# Patient Record
Sex: FEMALE | Race: WHITE | NOT HISPANIC OR LATINO
[De-identification: names, ages, dates, MRNs, and addresses within clinical notes are randomized per-mention and may not be internally consistent; named-entity substitution may affect disease eponyms.]

---

## 2018-08-22 ENCOUNTER — RESULT REVIEW (OUTPATIENT)
Age: 23
End: 2018-08-22

## 2019-06-26 PROBLEM — Z00.00 ENCOUNTER FOR PREVENTIVE HEALTH EXAMINATION: Status: ACTIVE | Noted: 2019-06-26

## 2019-07-22 ENCOUNTER — RX RENEWAL (OUTPATIENT)
Age: 24
End: 2019-07-22

## 2019-08-21 ENCOUNTER — RECORD ABSTRACTING (OUTPATIENT)
Age: 24
End: 2019-08-21

## 2019-08-21 DIAGNOSIS — Z82.62 FAMILY HISTORY OF OSTEOPOROSIS: ICD-10-CM

## 2019-08-21 DIAGNOSIS — A63.0 ANOGENITAL (VENEREAL) WARTS: ICD-10-CM

## 2019-08-21 DIAGNOSIS — Z82.49 FAMILY HISTORY OF ISCHEMIC HEART DISEASE AND OTHER DISEASES OF THE CIRCULATORY SYSTEM: ICD-10-CM

## 2019-08-21 DIAGNOSIS — R03.0 ELEVATED BLOOD-PRESSURE READING, W/OUT DIAGNOSIS OF HYPERTENSION: ICD-10-CM

## 2019-08-21 DIAGNOSIS — Z80.3 FAMILY HISTORY OF MALIGNANT NEOPLASM OF BREAST: ICD-10-CM

## 2019-08-21 DIAGNOSIS — J45.909 UNSPECIFIED ASTHMA, UNCOMPLICATED: ICD-10-CM

## 2019-08-21 DIAGNOSIS — Z78.9 OTHER SPECIFIED HEALTH STATUS: ICD-10-CM

## 2019-08-21 DIAGNOSIS — G43.909 MIGRAINE, UNSPECIFIED, NOT INTRACTABLE, W/OUT STATUS MIGRAINOSUS: ICD-10-CM

## 2019-08-21 LAB — CYTOLOGY CVX/VAG DOC THIN PREP: NORMAL

## 2019-08-24 ENCOUNTER — LABORATORY RESULT (OUTPATIENT)
Age: 24
End: 2019-08-24

## 2019-08-24 ENCOUNTER — TRANSCRIPTION ENCOUNTER (OUTPATIENT)
Age: 24
End: 2019-08-24

## 2019-08-24 ENCOUNTER — APPOINTMENT (OUTPATIENT)
Dept: OBGYN | Facility: CLINIC | Age: 24
End: 2019-08-24
Payer: COMMERCIAL

## 2019-08-24 PROCEDURE — 99395 PREV VISIT EST AGE 18-39: CPT

## 2019-08-24 PROCEDURE — 81025 URINE PREGNANCY TEST: CPT

## 2020-11-28 ENCOUNTER — RX RENEWAL (OUTPATIENT)
Age: 25
End: 2020-11-28

## 2020-12-03 RX ORDER — ETONOGESTREL AND ETHINYL ESTRADIOL 11.7; 2.7 MG/1; MG/1
0.12-0.015 INSERT, EXTENDED RELEASE VAGINAL
Qty: 1 | Refills: 0 | Status: ACTIVE | COMMUNITY
Start: 2019-07-22

## 2021-01-02 ENCOUNTER — APPOINTMENT (OUTPATIENT)
Dept: OBGYN | Facility: CLINIC | Age: 26
End: 2021-01-02
Payer: COMMERCIAL

## 2021-01-02 VITALS
TEMPERATURE: 97.2 F | DIASTOLIC BLOOD PRESSURE: 70 MMHG | SYSTOLIC BLOOD PRESSURE: 126 MMHG | HEIGHT: 59 IN | WEIGHT: 132 LBS | BODY MASS INDEX: 26.61 KG/M2

## 2021-01-02 DIAGNOSIS — Z80.1 FAMILY HISTORY OF MALIGNANT NEOPLASM OF TRACHEA, BRONCHUS AND LUNG: ICD-10-CM

## 2021-01-02 DIAGNOSIS — Z83.3 FAMILY HISTORY OF DIABETES MELLITUS: ICD-10-CM

## 2021-01-02 DIAGNOSIS — Z78.9 OTHER SPECIFIED HEALTH STATUS: ICD-10-CM

## 2021-01-02 DIAGNOSIS — Z01.419 ENCOUNTER FOR GYNECOLOGICAL EXAMINATION (GENERAL) (ROUTINE) W/OUT ABNORMAL FINDINGS: ICD-10-CM

## 2021-01-02 PROCEDURE — 99072 ADDL SUPL MATRL&STAF TM PHE: CPT

## 2021-01-02 PROCEDURE — 99395 PREV VISIT EST AGE 18-39: CPT

## 2021-01-02 RX ORDER — FREMANEZUMAB-VFRM 225 MG/1.5ML
225 INJECTION SUBCUTANEOUS
Qty: 2 | Refills: 0 | Status: ACTIVE | COMMUNITY
Start: 2020-11-05

## 2021-01-02 NOTE — HISTORY OF PRESENT ILLNESS
[Intravaginal Ring] : uses the intravaginal ring [Y] : Patient is sexually active [N] : Patient denies prior pregnancies [Menarche Age: ____] : age at menarche was [unfilled] [Currently Active] : currently active [Men] : men [Vaginal] : vaginal [No] : No [Yes] : Yes [Condoms] : Condoms [TextBox_4] : 25-year-old G0 LMP 12/30/2020 presents for annual gynecological exam\par \par Patient is requesting to continue NuvaRing and tolerating it well\par  precautions reviewed\par Patient is getting  in July 2021 at the [facility]\par She is a teacher [Mammogramdate] : NA [BreastSonogramDate] : 10/9/2019 [PapSmeardate] : 8/24/2019 [GonorrheaDate] : 8/24/2019 [ChlamydiaDate] : 8/24/2019 [LMPDate] : 12/30/2020 [PGHxTotal] : 0 [FreeTextEntry1] : 12/30/2020

## 2021-01-04 LAB
C TRACH RRNA SPEC QL NAA+PROBE: NOT DETECTED
N GONORRHOEA RRNA SPEC QL NAA+PROBE: NOT DETECTED
SOURCE TP AMPLIFICATION: NORMAL

## 2021-01-11 LAB — CYTOLOGY CVX/VAG DOC THIN PREP: ABNORMAL

## 2021-08-11 ENCOUNTER — NON-APPOINTMENT (OUTPATIENT)
Age: 26
End: 2021-08-11

## 2021-09-07 ENCOUNTER — APPOINTMENT (OUTPATIENT)
Dept: OBGYN | Facility: CLINIC | Age: 26
End: 2021-09-07
Payer: COMMERCIAL

## 2021-09-07 ENCOUNTER — NON-APPOINTMENT (OUTPATIENT)
Age: 26
End: 2021-09-07

## 2021-09-07 ENCOUNTER — RESULT CHARGE (OUTPATIENT)
Age: 26
End: 2021-09-07

## 2021-09-07 VITALS
HEIGHT: 59 IN | WEIGHT: 132 LBS | BODY MASS INDEX: 26.61 KG/M2 | DIASTOLIC BLOOD PRESSURE: 64 MMHG | SYSTOLIC BLOOD PRESSURE: 112 MMHG

## 2021-09-07 DIAGNOSIS — N92.1 EXCESSIVE AND FREQUENT MENSTRUATION WITH IRREGULAR CYCLE: ICD-10-CM

## 2021-09-07 PROCEDURE — 36415 COLL VENOUS BLD VENIPUNCTURE: CPT

## 2021-09-07 PROCEDURE — 99213 OFFICE O/P EST LOW 20 MIN: CPT

## 2021-09-07 PROCEDURE — 81025 URINE PREGNANCY TEST: CPT

## 2021-09-18 LAB
HCG SERPL-MCNC: <1 MIU/ML
HCG UR QL: NEGATIVE
QUALITY CONTROL: YES
TSH SERPL-ACNC: 2.2 UIU/ML

## 2021-10-29 NOTE — PLAN
[FreeTextEntry1] : Differentials and precautions for BTB reviewed\par Will check TSH/HCG\par Pt advised on call parameters for more immediate attention\par TX options and fu for persisting sxs reviewed\par Last pap/ GC  WNL 1/21\par FU 1/22 or sooner prn

## 2021-10-29 NOTE — HISTORY OF PRESENT ILLNESS
[Intravaginal Ring] : uses the intravaginal ring [Y] : Patient is sexually active [N] : Patient denies prior pregnancies [No] : Patient does not have concerns regarding sex [Currently Active] : currently active [Men] : men [Yes] : Yes [TextBox_4] : Pt presents with complaints of irregular periods [PapSmeardate] : 01/02/2021 [TextBox_31] : WNL [GonorrheaDate] : 01/02/2021 [TextBox_63] : NEG [ChlamydiaDate] : 01/02/2021 [TextBox_68] : NEG [LMPDate] : 08/27/2021 [PGHxTotal] : 0 [FreeTextEntry1] : 08/27/2021 [FreeTextEntry3] : NUVARING

## 2022-03-05 ENCOUNTER — APPOINTMENT (OUTPATIENT)
Dept: OBGYN | Facility: CLINIC | Age: 27
End: 2022-03-05
Payer: COMMERCIAL

## 2022-03-05 VITALS
DIASTOLIC BLOOD PRESSURE: 80 MMHG | SYSTOLIC BLOOD PRESSURE: 130 MMHG | HEIGHT: 59 IN | WEIGHT: 145 LBS | BODY MASS INDEX: 29.23 KG/M2

## 2022-03-05 PROCEDURE — 99395 PREV VISIT EST AGE 18-39: CPT

## 2022-03-05 NOTE — HISTORY OF PRESENT ILLNESS
[Y] : Patient uses contraception [Intravaginal Ring] : uses the intravaginal ring [N] : Patient denies prior pregnancies [Menarche Age: ____] : age at menarche was [unfilled] [No] : Patient does not have concerns regarding sex [PapSmeardate] : 01/02/21 [TextBox_31] : NEG [GonorrheaDate] : 01/02/21 [TextBox_63] : NEG [ChlamydiaDate] : 01/02/21 [TextBox_68] : NEG [LMPDate] : 03/03/22 [PGHxTotal] : 0 [FreeTextEntry1] : 03/03/22

## 2022-03-05 NOTE — PLAN
[FreeTextEntry1] : The benefits of daily exercise calcium and vitamin D was reviewed.\par \par The benefits of an annual skin screening exam with dermatology was reviewed.\par \par The importance of monthly self breast exam was emphasized.\par \par Follow-up in 1 year

## 2022-03-12 LAB — CYTOLOGY CVX/VAG DOC THIN PREP: NORMAL

## 2023-05-06 ENCOUNTER — NON-APPOINTMENT (OUTPATIENT)
Age: 28
End: 2023-05-06

## 2023-05-06 ENCOUNTER — APPOINTMENT (OUTPATIENT)
Dept: OBGYN | Facility: CLINIC | Age: 28
End: 2023-05-06
Payer: COMMERCIAL

## 2023-05-06 VITALS
BODY MASS INDEX: 28.22 KG/M2 | SYSTOLIC BLOOD PRESSURE: 130 MMHG | WEIGHT: 140 LBS | HEIGHT: 59 IN | DIASTOLIC BLOOD PRESSURE: 82 MMHG

## 2023-05-06 DIAGNOSIS — Z91.89 OTHER SPECIFIED PERSONAL RISK FACTORS, NOT ELSEWHERE CLASSIFIED: ICD-10-CM

## 2023-05-06 DIAGNOSIS — Z30.9 ENCOUNTER FOR CONTRACEPTIVE MANAGEMENT, UNSPECIFIED: ICD-10-CM

## 2023-05-06 DIAGNOSIS — N94.10 UNSPECIFIED DYSPAREUNIA: ICD-10-CM

## 2023-05-06 DIAGNOSIS — Z12.4 ENCOUNTER FOR SCREENING FOR MALIGNANT NEOPLASM OF CERVIX: ICD-10-CM

## 2023-05-06 DIAGNOSIS — Z01.419 ENCOUNTER FOR GYNECOLOGICAL EXAMINATION (GENERAL) (ROUTINE) W/OUT ABNORMAL FINDINGS: ICD-10-CM

## 2023-05-06 DIAGNOSIS — G43.009 MIGRAINE W/OUT AURA, NOT INTRACTABLE, W/OUT STATUS MIGRAINOSUS: ICD-10-CM

## 2023-05-06 PROCEDURE — 99213 OFFICE O/P EST LOW 20 MIN: CPT | Mod: 25

## 2023-05-06 PROCEDURE — 99395 PREV VISIT EST AGE 18-39: CPT

## 2023-05-06 RX ORDER — ETONOGESTREL AND ETHINYL ESTRADIOL 11.7; 2.7 MG/1; MG/1
0.12-0.015 INSERT, EXTENDED RELEASE VAGINAL
Qty: 1 | Refills: 0 | Status: ACTIVE | COMMUNITY
Start: 2021-01-02

## 2023-05-06 NOTE — HISTORY OF PRESENT ILLNESS
[Intravaginal Ring] : uses the intravaginal ring [Y] : Patient is sexually active [Menarche Age: ____] : age at menarche was [unfilled] [No] : Patient does not have concerns regarding sex [Currently Active] : currently active [PapSmeardate] : 03/05/22 [TextBox_31] : neg [GonorrheaDate] : 03/05/22 [TextBox_63] : neg [ChlamydiaDate] : 03/05/22 [TextBox_68] : neg [HPVDate] : 03/05/22 [LMPDate] : 04/08/23 [FreeTextEntry1] : 04/08/23

## 2023-05-06 NOTE — PLAN
[FreeTextEntry1] : Hx migraines- under neuro care past 10 yrs- - on nuva ring-\par advised neuro clearance for refills/ safety- r/t hx migraines- pt denies aura- contraindications risks nuva ring DVT/VTE-reviewed\par \par Advised high risk breast screening - pt wants to see her moms onc Dr. Romero/ Yenni risk assessment when to initiate imaging\par SBE emphasized\par CBE every 6 mos gyn alternating w high risk\par risks hormonal birth control reviewed -breast cancers affinity for hormones reviewed\par \par TVUS for cramping w menses on nuva ring\par fu results

## 2023-05-06 NOTE — COUNSELING
[Nutrition/ Exercise/ Weight Management] : nutrition, exercise, weight management [Vitamins/Supplements] : vitamins/supplements [Sunscreen] : sunscreen [Breast Self Exam] : breast self exam [STD (testing, results, tx)] : STD (testing, results, tx) [Contraception/ Emergency Contraception/ Safe Sexual Practices] : contraception, emergency contraception, safe sexual practices [Vaccines] : vaccines

## 2023-05-22 ENCOUNTER — APPOINTMENT (OUTPATIENT)
Dept: OBGYN | Facility: CLINIC | Age: 28
End: 2023-05-22
Payer: COMMERCIAL

## 2023-05-22 ENCOUNTER — ASOB RESULT (OUTPATIENT)
Age: 28
End: 2023-05-22

## 2023-05-22 ENCOUNTER — APPOINTMENT (OUTPATIENT)
Dept: ANTEPARTUM | Facility: CLINIC | Age: 28
End: 2023-05-22
Payer: COMMERCIAL

## 2023-05-22 VITALS
BODY MASS INDEX: 28.22 KG/M2 | SYSTOLIC BLOOD PRESSURE: 125 MMHG | HEIGHT: 59 IN | DIASTOLIC BLOOD PRESSURE: 77 MMHG | WEIGHT: 140 LBS

## 2023-05-22 DIAGNOSIS — N94.6 DYSMENORRHEA, UNSPECIFIED: ICD-10-CM

## 2023-05-22 PROCEDURE — 76830 TRANSVAGINAL US NON-OB: CPT

## 2023-05-22 PROCEDURE — 99213 OFFICE O/P EST LOW 20 MIN: CPT

## 2023-05-22 NOTE — HISTORY OF PRESENT ILLNESS
[Intravaginal Ring] : uses the intravaginal ring [Y] : Patient is sexually active [N] : Patient denies prior pregnancies [Menarche Age: ____] : age at menarche was [unfilled] [PapSmeardate] : 05/06/23 [TextBox_31] : NEG [HPVDate] : 05/06/23 [TextBox_78] : NEG [LMPDate] : 05/07/23 [PGHxTotal] : 0 [FreeTextEntry1] : 05/07/23

## 2023-06-05 LAB
C TRACH RRNA SPEC QL NAA+PROBE: NOT DETECTED
CYTOLOGY CVX/VAG DOC THIN PREP: NORMAL
N GONORRHOEA RRNA SPEC QL NAA+PROBE: NOT DETECTED
SOURCE TP AMPLIFICATION: NORMAL

## 2024-05-11 ENCOUNTER — APPOINTMENT (OUTPATIENT)
Dept: OBGYN | Facility: CLINIC | Age: 29
End: 2024-05-11